# Patient Record
Sex: FEMALE | Race: WHITE | URBAN - METROPOLITAN AREA
[De-identification: names, ages, dates, MRNs, and addresses within clinical notes are randomized per-mention and may not be internally consistent; named-entity substitution may affect disease eponyms.]

---

## 2016-12-30 LAB
CREATININE, EXTERNAL: 0.8
LDL-C, EXTERNAL: 110

## 2017-03-30 ENCOUNTER — OFFICE VISIT (OUTPATIENT)
Dept: ENDOCRINOLOGY | Age: 42
End: 2017-03-30

## 2017-03-30 VITALS
SYSTOLIC BLOOD PRESSURE: 140 MMHG | BODY MASS INDEX: 42.88 KG/M2 | OXYGEN SATURATION: 98 % | HEIGHT: 63 IN | HEART RATE: 105 BPM | RESPIRATION RATE: 20 BRPM | WEIGHT: 242 LBS | TEMPERATURE: 98 F | DIASTOLIC BLOOD PRESSURE: 101 MMHG

## 2017-03-30 DIAGNOSIS — E66.09 NON MORBID OBESITY DUE TO EXCESS CALORIES: ICD-10-CM

## 2017-03-30 DIAGNOSIS — R73.02 GLUCOSE INTOLERANCE (IMPAIRED GLUCOSE TOLERANCE): ICD-10-CM

## 2017-03-30 DIAGNOSIS — E06.3 HYPOTHYROIDISM DUE TO HASHIMOTO'S THYROIDITIS: Primary | ICD-10-CM

## 2017-03-30 DIAGNOSIS — E03.8 HYPOTHYROIDISM DUE TO HASHIMOTO'S THYROIDITIS: Primary | ICD-10-CM

## 2017-03-30 RX ORDER — LEVOTHYROXINE SODIUM 112 UG/1
TABLET ORAL
COMMUNITY

## 2017-03-30 NOTE — PROGRESS NOTES
HISTORY OF PRESENT ILLNESS  Ralf Cochran is a 42 y.o. female. HPI  Initial visit for hypothyroidism     Diagnosed for 10 years   Has been taking   112 mcg a day         Review of Systems   Constitutional: Negative. HENT: Negative. Eyes: Negative for pain and redness. Respiratory: Negative. Cardiovascular: Negative for chest pain, palpitations and leg swelling. Gastrointestinal: Negative. Negative for constipation. Genitourinary: Negative. Musculoskeletal: Negative for myalgias. Skin: Negative. Neurological: Negative. Endo/Heme/Allergies: Negative. Psychiatric/Behavioral: Negative for depression and memory loss. The patient does not have insomnia. Physical Exam   Constitutional: She is oriented to person, place, and time. She appears well-developed and well-nourished. HENT:   Head: Normocephalic. Eyes: Conjunctivae and EOM are normal. Pupils are equal, round, and reactive to light. Neck: Normal range of motion. Neck supple. No JVD present. No tracheal deviation present. No thyromegaly present. Cardiovascular: Normal rate, regular rhythm and normal heart sounds. No murmur heard. Pulmonary/Chest: Breath sounds normal.   Abdominal: Soft. Bowel sounds are normal.   Musculoskeletal: Normal range of motion. Lymphadenopathy:     She has no cervical adenopathy. Neurological: She is alert and oriented to person, place, and time. She has normal reflexes. Skin: Skin is warm. Psychiatric: She has a normal mood and affect. TSH is 6.45  From dec 2016      ASSESSMENT and PLAN    1. Hypothyroidism :    2. Fatigue     3. Obesity :  Body mass index is 42.87 kg/(m^2).   S/p  Hysterectomy and one ovary removal

## 2017-03-30 NOTE — PATIENT INSTRUCTIONS
Salivary swab  times 2 at 11 pm       --------------------------------------------------------------------------------------------------  Do not skip meals  Do not eat in between meals    Reduce carbs- pasta, rice, potatoes, bread, croissants, biscuits , pastries,  And  cakes   Do not drink juices or sodas  Donot eat peanut butter     Do not eat sugar free cookies and cakes     ---------------------------------------------------------------------------------------------------------------------------------    One pill first week 2 pills second week AM PM  and 3 pills third week  2 in AM and 1 PM  and four pills fourth week 2 pills AM PM

## 2017-03-30 NOTE — MR AVS SNAPSHOT
Visit Information Date & Time Provider Department Dept. Phone Encounter #  
 3/30/2017  4:00 PM Wellington Samuel MD [facility] Diabetes [facility] End[facility] [phone] [id number] Follow-up Instructions Return in about 2 months (around 2017). Upcoming Health Maintenance Date Due DTaP/Tdap/Td series (1 - Tdap) 12/10/1995 PAP AKA CERVICAL CYTOLOGY 12/10/1995 INFLUENZA AGE 9 TO ADULT 2016 Allergies as of 3/30/2017  Review Complete On: 3/30/2017 By: Wellington Samuel MD  
  
 Severity Noted Reaction Type Reactions Pcn [Penicillins]  2017    Hives Current Immunizations  Never Reviewed No immunizations on file. Not reviewed this visit You Were Diagnosed With   
  
 Codes Comments Hypothyroidism due to Hashimoto's thyroiditis    -  Primary ICD-10-CM: E03.8, E06.3 ICD-9-CM: 244.8, 245.2 Non morbid obesity due to excess calories     ICD-10-CM: E66.09 
ICD-9-CM: 278.00 Glucose intolerance (impaired glucose tolerance)     ICD-10-CM: R73.02 
ICD-9-CM: 790.22 Vitals BP Pulse Temp Resp Height(growth percentile) Weight(growth percentile) (!) 140/101 (!) 105 98 °F (36.7 °C) (Oral) 20 5' 3\" (1.6 m) 242 lb (109.8 kg) SpO2 BMI OB Status Smoking Status 98% 42.87 kg/m2 Hysterectomy Never Smoker BMI and BSA Data Body Mass Index Body Surface Area  
 42.87 kg/m 2 2.21 m 2 Your Updated Medication List  
  
   
This list is accurate as of: 3/30/17  5:21 PM.  Always use your most recent med list.  
  
  
  
  
 levothyroxine 112 mcg tablet Commonly known as:  SYNTHROID Take  by mouth Daily (before breakfast). naltrexone-buPROPion 8-90 mg Tber ER tablet Commonly known as:  Allyn Tapia 2QAM 2QHS Prescriptions Printed Refills  
 naltrexone-buPROPion (CONTRAVE) 8-90 mg TbER ER tablet 6 SiQAM 2QHS Class: Print We Performed the Following HEMOGLOBIN A1C WITH EAG [79491 CPT(R)] T4, FREE [id number] CPT(R)] THYROID PEROXIDASE (TPO) AB [16060 CPT(R)] TSH 3RD GENERATION [14399 CPT(R)] Follow-up Instructions Return in about 2 months (around 5/30/2017). Patient Instructions Salivary swab  times 2 at 11 pm  
 
 
-------------------------------------------------------------------------------------------------- Do not skip meals Do not eat in between meals Reduce carbs- pasta, rice, potatoes, bread, croissants, biscuits , pastries,  And  cakes Do not drink juices or sodas Donot eat peanut butter Do not eat sugar free cookies and cakes  
 
--------------------------------------------------------------------------------------------------------------------------------- One pill first week 2 pills second week AM PM  and 3 pills third week  2 in AM and 1 PM  and four pills fourth week 2 pills AM PM 
 
 
 
 
  
Introducing [employer] introduces [employer] patient portal. Now you can access parts of your medical record, email your doctor's office, and request medication refills online. 1. In your internet browser, go to [email or web address] [email or web address] 2. Click on the First Time User? Click Here link in the Sign In box. You will see the New Member Sign Up page. 3. Enter your [employer] Access Code exactly as it appears below. You will not need to use this code after youve completed the sign-up process. If you do not sign up before the expiration date, you must request a new code. · [employer] Access Code: [email or web address] Expires: 6/28/2017  5:15 PM 
 
4. Enter the last four digits of your Social Security Number (xxxx) and Date of Birth (mm/dd/yyyy) as indicated and click Submit. You will be taken to the next sign-up page. 5. Create a [employer] ID. This will be your [employer] login ID and cannot be changed, so think of one that is secure and easy to remember. 6. Create a [employer] password. You can change your password at any time. 7. Enter your Password Reset Question and Answer. This can be used at a later time if you forget your password. 8. Enter your e-mail address. You will receive e-mail notification when new information is available in [address] 9. Click Sign Up. You can now view and download portions of your medical record. 10. Click the Download Summary menu link to download a portable copy of your medical information. If you have questions, please visit the Frequently Asked Questions section of the [employer] website. Remember, [employer] is NOT to be used for urgent needs. For medical emergencies, dial 911. Now available from your iPhone and Android! Please provide this summary of care documentation to your next provider. Your primary care clinician is listed as Maggi Gil. If you have any questions after today's visit, please call [phone].

## 2017-03-30 NOTE — PROGRESS NOTES
Wt Readings from Last 3 Encounters:   03/30/17 242 lb (109.8 kg)     Temp Readings from Last 3 Encounters:   03/30/17 98 °F (36.7 °C) (Oral)     BP Readings from Last 3 Encounters:   03/30/17 (!) 140/101     Pulse Readings from Last 3 Encounters:   03/30/17 (!) 105

## 2017-04-01 LAB
EST. AVERAGE GLUCOSE BLD GHB EST-MCNC: 114 MG/DL
HBA1C MFR BLD: 5.6 % (ref 4.8–5.6)
T4 FREE SERPL-MCNC: 1.54 NG/DL (ref 0.82–1.77)
THYROPEROXIDASE AB SERPL-ACNC: 182 IU/ML (ref 0–34)
TSH SERPL DL<=0.005 MIU/L-ACNC: 3.17 UIU/ML (ref 0.45–4.5)

## 2017-04-04 ENCOUNTER — TELEPHONE (OUTPATIENT)
Dept: ENDOCRINOLOGY | Age: 42
End: 2017-04-04

## 2017-04-04 DIAGNOSIS — E66.09 NON MORBID OBESITY DUE TO EXCESS CALORIES: Primary | ICD-10-CM

## 2017-04-09 LAB
CORTIS BS SAL-MCNC: 0.05 UG/DL
CORTIS SP1 P CHAL SAL-MCNC: 0.01 UG/DL